# Patient Record
Sex: FEMALE | Race: BLACK OR AFRICAN AMERICAN | NOT HISPANIC OR LATINO | URBAN - METROPOLITAN AREA
[De-identification: names, ages, dates, MRNs, and addresses within clinical notes are randomized per-mention and may not be internally consistent; named-entity substitution may affect disease eponyms.]

---

## 2023-11-28 ENCOUNTER — APPOINTMENT (OUTPATIENT)
Dept: OBSTETRICS AND GYNECOLOGY | Facility: HOSPITAL | Age: 23
End: 2023-11-28
Payer: COMMERCIAL

## 2024-03-25 ENCOUNTER — APPOINTMENT (OUTPATIENT)
Dept: PRIMARY CARE | Facility: CLINIC | Age: 24
End: 2024-03-25
Payer: COMMERCIAL

## 2024-04-26 ENCOUNTER — OFFICE VISIT (OUTPATIENT)
Dept: PRIMARY CARE | Facility: CLINIC | Age: 24
End: 2024-04-26
Payer: COMMERCIAL

## 2024-04-26 VITALS
BODY MASS INDEX: 21.88 KG/M2 | OXYGEN SATURATION: 98 % | SYSTOLIC BLOOD PRESSURE: 127 MMHG | HEART RATE: 76 BPM | DIASTOLIC BLOOD PRESSURE: 85 MMHG | WEIGHT: 139.4 LBS | HEIGHT: 67 IN

## 2024-04-26 DIAGNOSIS — J45.20 MILD INTERMITTENT ASTHMA WITHOUT COMPLICATION (HHS-HCC): Primary | ICD-10-CM

## 2024-04-26 DIAGNOSIS — Z01.419 WELL WOMAN EXAM: ICD-10-CM

## 2024-04-26 DIAGNOSIS — Z00.00 ROUTINE GENERAL MEDICAL EXAMINATION AT A HEALTH CARE FACILITY: ICD-10-CM

## 2024-04-26 PROCEDURE — 99385 PREV VISIT NEW AGE 18-39: CPT | Performed by: STUDENT IN AN ORGANIZED HEALTH CARE EDUCATION/TRAINING PROGRAM

## 2024-04-26 PROCEDURE — 90471 IMMUNIZATION ADMIN: CPT | Performed by: STUDENT IN AN ORGANIZED HEALTH CARE EDUCATION/TRAINING PROGRAM

## 2024-04-26 PROCEDURE — 90715 TDAP VACCINE 7 YRS/> IM: CPT | Performed by: STUDENT IN AN ORGANIZED HEALTH CARE EDUCATION/TRAINING PROGRAM

## 2024-04-26 PROCEDURE — 4004F PT TOBACCO SCREEN RCVD TLK: CPT | Performed by: STUDENT IN AN ORGANIZED HEALTH CARE EDUCATION/TRAINING PROGRAM

## 2024-04-26 RX ORDER — EPINEPHRINE 0.3 MG/.3ML
INJECTION SUBCUTANEOUS
COMMUNITY
Start: 2020-01-23

## 2024-04-26 RX ORDER — ALBUTEROL SULFATE 0.83 MG/ML
2.5 SOLUTION RESPIRATORY (INHALATION) 4 TIMES DAILY PRN
Qty: 90 ML | Refills: 3 | Status: SHIPPED | OUTPATIENT
Start: 2024-04-26 | End: 2025-04-26

## 2024-04-26 RX ORDER — ALBUTEROL SULFATE 90 UG/1
AEROSOL, METERED RESPIRATORY (INHALATION)
Qty: 18 G | Refills: 6 | Status: SHIPPED | OUTPATIENT
Start: 2024-04-26

## 2024-04-26 RX ORDER — PREDNISONE 20 MG/1
TABLET ORAL
COMMUNITY
Start: 2024-01-21

## 2024-04-26 RX ORDER — ALBUTEROL SULFATE 90 UG/1
AEROSOL, METERED RESPIRATORY (INHALATION)
COMMUNITY
End: 2024-04-26 | Stop reason: SDUPTHER

## 2024-04-26 ASSESSMENT — ENCOUNTER SYMPTOMS
OCCASIONAL FEELINGS OF UNSTEADINESS: 0
DEPRESSION: 0
LOSS OF SENSATION IN FEET: 0

## 2024-04-26 NOTE — PROGRESS NOTES
"Subjective   Patient ID: Bev Patel is a 23 y.o. female who presents for Annual Exam (CPE. Pt declined flu shot. Discuss marijuana card.).    Last Physical : ____ Years ago     Pt's PMH, PSH, SH, FH , meds and allergies was obtained / reviewed and updated .     Dental  Visits : Y  Vision issues : N  Hearing issues : N    Immunizations : Y    Diet :  Could be better   Exercise:  Not regularly  Weight concerns : None    Alcohol: as noted in   Tobacco: as noted in   Recreational drugs :  None /as noted in        ==================================    Visit Vitals  /85   Pulse 76   Ht 1.702 m (5' 7\")   Wt 63.2 kg (139 lb 6.4 oz)   LMP 04/14/2024   SpO2 98%   BMI 21.83 kg/m²   Smoking Status Some Days   BSA 1.73 m²      =====================  Review of Systems:    Constitutional: no chills, no fever and no night sweats.     Eyes: no blurred vision and no eyesight problems.     ENT: no hearing loss, no nasal congestion, no nasal discharge, no hoarseness and no sore throat.     Cardiovascular: no chest pain, no intermittent leg claudication, no lower extremity edema, no palpitations and no syncope.     Respiratory: no cough, no shortness of breath during exertion, no shortness of breath at rest and no wheezing.     Gastrointestinal: no abdominal pain, no constipation, no blood in stools, no diarrhea, no melena, no nausea, no rectal pain and no vomiting.     Genitourinary: no dysuria, no change in urinary frequency, no urinary hesitancy and no feelings of urinary urgency.     Musculoskeletal: no arthralgias, no back pain and no myalgias.     Integumentary: no new skin lesions and no rashes.     Neurological: no difficulty walking, no headache, no limb weakness, no numbness and no tingling.     Psychiatric: no anxiety, no depression, no anhedonia and no substance use disorders.     Endocrine: no recent weight gain and no recent weight loss.     Hematologic/Lymphatic: no tendency for easy bruising and no " swollen glands.          All other systems have been reviewed and are negative for complaint.    =====================================================    Physical exam :    Constitutional: Alert and in no acute distress. Well developed, well nourished.     Eyes: Normal external exam. Pupils were equal in size, round, reactive to light (PERRL) with normal accommodation and extraocular movements intact (EOMI).     Ears, Nose, Mouth, and Throat: External inspection of ears and nose: Normal.  Otoscopic examination: Normal.      Neck: No neck mass was observed. Supple.     Cardiovascular: Heart rate and rhythm were normal, normal S1 and S2, no gallops, no murmurs and no pericardial rub    Pulmonary: No respiratory distress. Clear bilateral breath sounds.     Abdomen: Soft nontender; no abdominal mass palpated. No organomegaly.     Musculoskeletal: No joint swelling seen, normal movements of all extremities. Range of motion: Normal.  Muscle strength/tone: Normal.      Neurologic: Deep tendon reflexes were 2+ and symmetric. Sensation: Normal.     Psychiatric: Judgment and insight: Intact. Mood and affect: Normal.        Assessment/Plan    Diagnoses and all orders for this visit:  Mild intermittent asthma without complication ([facility])  -     albuterol 90 mcg/actuation inhaler; INHALE 2 PUFFS INTO THE LUNGS 4 TIMES A DAY  -     albuterol 2.5 mg /3 mL (0.083 %) nebulizer solution; Take 3 mL (2.5 mg) by nebulization 4 times a day as needed for wheezing or shortness of breath.  Routine general medical examination at a health care facility  Well woman exam  -     Referral to Gynecology; Future     24 y/o  female with mild intermittent  asthma presents to est care     Mild intermittent asthma :  Trigger: weather changes   Asthma - E twice a year, uses [facility]     HM :   Vaccines:  -Tdap : given today   -Flu :     Cancer screenings:  Gyn referral for pap      General preventive care discussed which includes regular exercise,  healthy eating habits, to include more of plant based diet, to include foods of all colors  , limiting excessive red meat intake , staying active to maintain a weight that is appropriate for his/ her height / making efforts to reach an ideal weight for height,  avoidance of smoking, excess alcohol consumption, avoidance of recreational drugs, safe and responsible sexual relationships and practices.     Calcium + Vit D supplements recommended   Regular exercise recommended   Healthy eating choices discussed and recommended       Conditions addressed and mgmt as noted above.  Pertinent labs, images/ imaging reports , chart review was done .     Labs deferred by pt

## 2024-12-23 DIAGNOSIS — J45.20 MILD INTERMITTENT ASTHMA WITHOUT COMPLICATION (HHS-HCC): ICD-10-CM

## 2024-12-25 RX ORDER — ALBUTEROL SULFATE 0.83 MG/ML
2.5 SOLUTION RESPIRATORY (INHALATION) 4 TIMES DAILY PRN
Qty: 75 ML | Refills: 3 | Status: SHIPPED | OUTPATIENT
Start: 2024-12-25 | End: 2025-12-25

## 2025-04-27 DIAGNOSIS — J45.20 MILD INTERMITTENT ASTHMA WITHOUT COMPLICATION (HHS-HCC): ICD-10-CM

## 2025-04-29 RX ORDER — ALBUTEROL SULFATE 90 UG/1
INHALANT RESPIRATORY (INHALATION)
Qty: 8.5 G | Refills: 1 | Status: SHIPPED | OUTPATIENT
Start: 2025-04-29

## 2025-06-27 DIAGNOSIS — J45.20 MILD INTERMITTENT ASTHMA WITHOUT COMPLICATION (HHS-HCC): ICD-10-CM

## 2025-06-28 RX ORDER — ALBUTEROL SULFATE 90 UG/1
INHALANT RESPIRATORY (INHALATION)
Qty: 8 G | Refills: 1 | Status: SHIPPED | OUTPATIENT
Start: 2025-06-28

## 2025-07-31 ENCOUNTER — APPOINTMENT (OUTPATIENT)
Dept: OBSTETRICS AND GYNECOLOGY | Facility: CLINIC | Age: 25
End: 2025-07-31
Payer: COMMERCIAL